# Patient Record
Sex: MALE | Race: BLACK OR AFRICAN AMERICAN | NOT HISPANIC OR LATINO | URBAN - METROPOLITAN AREA
[De-identification: names, ages, dates, MRNs, and addresses within clinical notes are randomized per-mention and may not be internally consistent; named-entity substitution may affect disease eponyms.]

---

## 2020-01-18 ENCOUNTER — EMERGENCY (EMERGENCY)
Facility: HOSPITAL | Age: 30
LOS: 0 days | Discharge: HOME | End: 2020-01-18
Payer: MEDICAID

## 2020-01-18 VITALS
HEART RATE: 92 BPM | WEIGHT: 220.02 LBS | DIASTOLIC BLOOD PRESSURE: 92 MMHG | RESPIRATION RATE: 18 BRPM | OXYGEN SATURATION: 99 % | HEIGHT: 71 IN | TEMPERATURE: 98 F | SYSTOLIC BLOOD PRESSURE: 161 MMHG

## 2020-01-18 DIAGNOSIS — H92.02 OTALGIA, LEFT EAR: ICD-10-CM

## 2020-01-18 DIAGNOSIS — H66.92 OTITIS MEDIA, UNSPECIFIED, LEFT EAR: ICD-10-CM

## 2020-01-18 PROCEDURE — 99283 EMERGENCY DEPT VISIT LOW MDM: CPT

## 2020-01-18 RX ORDER — IBUPROFEN 200 MG
1 TABLET ORAL
Qty: 28 | Refills: 0
Start: 2020-01-18 | End: 2020-01-24

## 2020-01-18 NOTE — ED PROVIDER NOTE - PATIENT PORTAL LINK FT
You can access the FollowMyHealth Patient Portal offered by [facility] by registering at the following website: [email or web address]. By joining [employer]’s FollowMyHealth portal, you will also be able to view your health information using other applications (apps) compatible with our system.

## 2021-06-15 ENCOUNTER — EMERGENCY (EMERGENCY)
Facility: HOSPITAL | Age: 31
LOS: 1 days | Discharge: HOME | End: 2021-06-15
Attending: EMERGENCY MEDICINE | Admitting: EMERGENCY MEDICINE
Payer: MEDICAID

## 2021-06-15 VITALS
OXYGEN SATURATION: 99 % | WEIGHT: 223.33 LBS | SYSTOLIC BLOOD PRESSURE: 111 MMHG | RESPIRATION RATE: 18 BRPM | HEART RATE: 95 BPM | DIASTOLIC BLOOD PRESSURE: 83 MMHG | TEMPERATURE: 98 F

## 2021-06-15 VITALS
SYSTOLIC BLOOD PRESSURE: 112 MMHG | TEMPERATURE: 98 F | HEART RATE: 90 BPM | DIASTOLIC BLOOD PRESSURE: 75 MMHG | OXYGEN SATURATION: 99 % | RESPIRATION RATE: 18 BRPM

## 2021-06-15 PROCEDURE — 99284 EMERGENCY DEPT VISIT MOD MDM: CPT

## 2021-06-15 PROCEDURE — 99283 EMERGENCY DEPT VISIT LOW MDM: CPT | Mod: 25

## 2021-06-15 PROCEDURE — 73030 X-RAY EXAM OF SHOULDER: CPT | Mod: 26,RT

## 2021-06-15 PROCEDURE — 73030 X-RAY EXAM OF SHOULDER: CPT

## 2021-06-15 RX ORDER — IBUPROFEN 200 MG
1 TABLET ORAL
Qty: 40 | Refills: 0
Start: 2021-06-15 | End: 2021-06-24

## 2021-06-15 RX ORDER — IBUPROFEN 200 MG
800 TABLET ORAL ONCE
Refills: 0 | Status: COMPLETED | OUTPATIENT
Start: 2021-06-15 | End: 2021-06-15

## 2021-06-15 RX ADMIN — Medication 800 MILLIGRAM(S): at 00:58

## 2021-06-15 NOTE — ED PROVIDER NOTE - UPPER EXTREMITY EXAM, RIGHT
ttp over superior right shoulder, no ecchymosis/swelling evident, normal ROM, 5/5 strength, normal sensory function, 2+ radial pulse, normal radial/median/ulnar nerve function/TENDERNESS

## 2021-06-15 NOTE — ED PROVIDER NOTE - NSFOLLOWUPCLINICS_GEN_ALL_ED_FT
[facility]  Orthopedics  [address] [place] [address]  Phone: [phone]  Fax: [phone]

## 2021-06-15 NOTE — ED ADULT NURSE NOTE - NS ED NURSE RECORD ANOTHER HT AND WT
Pt Coreg is was filled per protocol.  Seen 9/2018 upcoming 3/2019  Last filled 11/2017 for a year   Yes
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
## 2021-06-15 NOTE — ED PROVIDER NOTE - NSFOLLOWUPINSTRUCTIONS_ED_ALL_ED_FT
take medications as prescribed for pain. Use arm sling as needed for comfort.  Followup with orthopedic specialist if pain continues after 1 week.      Shoulder Pain    WHAT YOU NEED TO KNOW:    Shoulder pain is a common problem that can affect your daily activities. Pain can be caused by a problem within your shoulder, such as soreness of a tendon or bursa. A tendon is a cord of tough tissue that connects your muscles to your bones. The bursa is a fluid-filled sac that acts as a cushion between a bone and a tendon. Shoulder pain may also be caused by pain that spreads to your shoulder from another part of your body.    Shoulder Anatomy         DISCHARGE INSTRUCTIONS:    Return to the emergency department if:   •You have severe pain.      •You cannot move your arm or shoulder.      •You have numbness or tingling in your shoulder or arm.      Contact your healthcare provider if:   •Your pain gets worse or does not go away with treatment.      •You have trouble moving your arm or shoulder.      •You have questions or concerns about your condition or care.      Medicines: You may need any of the following:   •Acetaminophen decreases pain and fever. It is available without a doctor's order. Ask how much to take and how often to take it. Follow directions. Read the labels of all other medicines you are using to see if they also contain acetaminophen, or ask your doctor or pharmacist. Acetaminophen can cause liver damage if not taken correctly. Do not use more than 4 grams (4,000 milligrams) total of acetaminophen in one day.       •NSAIDs, such as ibuprofen, help decrease swelling, pain, and fever. This medicine is available with or without a doctor's order. NSAIDs can cause stomach bleeding or kidney problems in certain people. If you take blood thinner medicine, always ask your healthcare provider if NSAIDs are safe for you. Always read the medicine label and follow directions.      •Take your medicine as directed. Contact your healthcare provider if you think your medicine is not helping or if you have side effects. Tell him of her if you are allergic to any medicine. Keep a list of the medicines, vitamins, and herbs you take. Include the amounts, and when and why you take them. Bring the list or the pill bottles to follow-up visits. Carry your medicine list with you in case of an emergency.      Manage your symptoms:   •Apply ice on your shoulder for 20 to 30 minutes every 2 hours or as directed. Use an ice pack, or put crushed ice in a plastic bag. Cover it with a towel before you apply it to your shoulder. Ice helps prevent tissue damage and decreases swelling and pain.      •Apply heat if ice does not help your symptoms. Apply heat on your shoulder for 20 to 30 minutes every 2 hours for as many days as directed. Heat helps decrease pain and muscle spasms.      •Limit activities as directed. Try to avoid repeated overhead movements.      •Go to physical or occupational therapy as directed. A physical therapist teaches you exercises to help improve movement and strength, and to decrease pain. An occupational therapist teaches you skills to help with your daily activities.       Prevent shoulder pain:   •Maintain a good range of motion in your shoulder. Ask your healthcare provider which exercises you should do on a regular basis after you have healed.       •Stretch and strengthen your shoulder. Use proper technique during exercises and sports.      Follow up with your healthcare provider or orthopedist as directed: Write down your questions so you remember to ask them during your visits.
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
## 2021-06-15 NOTE — ED PROVIDER NOTE - PATIENT PORTAL LINK FT
You can access the FollowMyHealth Patient Portal offered by [facility] by registering at the following website: [email or web address]. By joining [employer]’s FollowMyHealth portal, you will also be able to view your health information using other applications (apps) compatible with our system.

## 2021-06-16 PROBLEM — Z78.9 OTHER SPECIFIED HEALTH STATUS: Chronic | Status: ACTIVE | Noted: 2020-01-18

## 2022-05-02 ENCOUNTER — EMERGENCY (EMERGENCY)
Facility: HOSPITAL | Age: 32
LOS: 0 days | Discharge: HOME | End: 2022-05-02
Payer: COMMERCIAL

## 2022-05-02 VITALS
RESPIRATION RATE: 19 BRPM | SYSTOLIC BLOOD PRESSURE: 109 MMHG | TEMPERATURE: 98 F | DIASTOLIC BLOOD PRESSURE: 68 MMHG | OXYGEN SATURATION: 99 % | HEART RATE: 67 BPM

## 2022-05-02 VITALS
HEART RATE: 68 BPM | SYSTOLIC BLOOD PRESSURE: 123 MMHG | TEMPERATURE: 98 F | WEIGHT: 221.34 LBS | OXYGEN SATURATION: 98 % | DIASTOLIC BLOOD PRESSURE: 78 MMHG | HEIGHT: 71 IN | RESPIRATION RATE: 16 BRPM

## 2022-05-02 DIAGNOSIS — S09.90XA UNSPECIFIED INJURY OF HEAD, INITIAL ENCOUNTER: ICD-10-CM
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
## 2022-05-02 DIAGNOSIS — M54.50 LOW BACK PAIN, UNSPECIFIED: ICD-10-CM

## 2022-05-02 PROBLEM — Z78.9 OTHER SPECIFIED HEALTH STATUS: Chronic | Status: ACTIVE | Noted: 2021-06-15

## 2022-05-02 PROCEDURE — 72100 X-RAY EXAM L-S SPINE 2/3 VWS: CPT | Mod: 26

## 2022-05-02 PROCEDURE — 99284 EMERGENCY DEPT VISIT MOD MDM: CPT

## 2022-05-02 RX ORDER — KETOROLAC TROMETHAMINE 30 MG/ML
30 SYRINGE (ML) INJECTION ONCE
Refills: 0 | Status: DISCONTINUED | OUTPATIENT
Start: 2022-05-02 | End: 2022-05-02

## 2022-05-02 RX ORDER — CYCLOBENZAPRINE HYDROCHLORIDE 10 MG/1
10 TABLET, FILM COATED ORAL ONCE
Refills: 0 | Status: COMPLETED | OUTPATIENT
Start: 2022-05-02 | End: 2022-05-02

## 2022-05-02 RX ORDER — LIDOCAINE 4 G/100G
1 CREAM TOPICAL ONCE
Refills: 0 | Status: COMPLETED | OUTPATIENT
Start: 2022-05-02 | End: 2022-05-02

## 2022-05-02 RX ORDER — CYCLOBENZAPRINE HYDROCHLORIDE 10 MG/1
1 TABLET, FILM COATED ORAL
Qty: 15 | Refills: 0
Start: 2022-05-02 | End: 2022-05-06

## 2022-05-02 RX ADMIN — LIDOCAINE 1 PATCH: 4 CREAM TOPICAL at 16:06

## 2022-05-02 RX ADMIN — CYCLOBENZAPRINE HYDROCHLORIDE 10 MILLIGRAM(S): 10 TABLET, FILM COATED ORAL at 16:04
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
## 2022-05-02 NOTE — ED PROVIDER NOTE - NSFOLLOWUPINSTRUCTIONS_ED_ALL_ED_FT
Rest, drink plenty of fluids.  Advance activity as tolerated.  Continue all previously prescribed medications as directed.  Follow up with your primary care physician in 48-72 hours- bring copies of your results.  Return to the ER for worsening or persistent symptoms, and/or ANY NEW OR CONCERNING SYMPTOMS. If you have issues obtaining follow up, please call: [phone] to obtain a doctor or specialist who takes your insurance in your area.  You may call [phone] to make an appointment with the internal medicine clinic.    Please follow up with your primary care doctor, please return if symptoms persist or worsen.

## 2022-05-02 NOTE — ED ADULT NURSE NOTE - CAS TRG GEN SKIN CONDITION
Head, normocephalic, atraumatic, Face, Face within normal limits, Ears, External ears within normal limits, Nose/Nasopharynx, External nose  normal appearance, nares patent, no nasal discharge, Mouth and Throat, Oral cavity appearance normal, Breath odor normal, Lips, Appearance normal Warm/Dry

## 2022-05-02 NOTE — ED PROVIDER NOTE - PATIENT PORTAL LINK FT
You can access the FollowMyHealth Patient Portal offered by [facility] by registering at the following website: [email or web address]. By joining [employer]’s FollowMyHealth portal, you will also be able to view your health information using other applications (apps) compatible with our system.

## 2022-07-05 NOTE — ED PROVIDER NOTE - IV ALTEPLASE INCLUSION HIDDEN
SLEEP LAB (Larisa or [facility]) will contact you to schedulethe sleep study. Their number is [phone] (ext [phone]). Please call them if you do not hear from them in 2 weeks from now.  The [facility] Sleep Lab is located on 7th floor of the [facility]; [place] lab is located in [facility].    SLEEP CLINIC (my assistant) will call you when the sleep study results are ready - if you have not heard from us by 2 weeks from the date of the study, please call [phone] (ext 1) or you can use [facility] to contact me.    You are advised to abstain from driving should you feel sleepy or drowsy.    Bring your sleep aid to the sleep lab    
show

## 2022-07-25 NOTE — ED ADULT NURSE NOTE - HOW OFTEN DO YOU HAVE A DRINK CONTAINING ALCOHOL?
Never
Pete: Patient doing well on reassessment. Feeling much better s/p ivf, reglan. Discussed negative findings and plan for pt to f/u w/ ob or pcp. Patient amendable to discharge at this time. Metoclopramide sent to pharmacy.